# Patient Record
Sex: FEMALE | Race: WHITE | Employment: UNEMPLOYED | URBAN - METROPOLITAN AREA
[De-identification: names, ages, dates, MRNs, and addresses within clinical notes are randomized per-mention and may not be internally consistent; named-entity substitution may affect disease eponyms.]

---

## 2023-06-01 ENCOUNTER — HOSPITAL ENCOUNTER (EMERGENCY)
Age: 28
Discharge: HOME | End: 2023-06-02
Attending: EMERGENCY MEDICINE
Payer: MEDICAID

## 2023-06-01 ENCOUNTER — APPOINTMENT (OUTPATIENT)
Dept: GENERAL RADIOLOGY | Age: 28
End: 2023-06-01
Payer: MEDICAID

## 2023-06-01 DIAGNOSIS — J06.9 ACUTE UPPER RESPIRATORY INFECTION: Primary | ICD-10-CM

## 2023-06-01 DIAGNOSIS — J45.901 EXACERBATION OF ASTHMA, UNSPECIFIED ASTHMA SEVERITY, UNSPECIFIED WHETHER PERSISTENT: ICD-10-CM

## 2023-06-01 PROCEDURE — 87635 SARS-COV-2 COVID-19 AMP PRB: CPT

## 2023-06-01 PROCEDURE — 96372 THER/PROPH/DIAG INJ SC/IM: CPT

## 2023-06-01 PROCEDURE — 99284 EMERGENCY DEPT VISIT MOD MDM: CPT

## 2023-06-01 PROCEDURE — 87804 INFLUENZA ASSAY W/OPTIC: CPT

## 2023-06-01 PROCEDURE — 71045 X-RAY EXAM CHEST 1 VIEW: CPT

## 2023-06-01 PROCEDURE — 6360000002 HC RX W HCPCS: Performed by: EMERGENCY MEDICINE

## 2023-06-01 PROCEDURE — 87880 STREP A ASSAY W/OPTIC: CPT

## 2023-06-01 RX ORDER — METHYLPREDNISOLONE SODIUM SUCCINATE 125 MG/2ML
125 INJECTION, POWDER, LYOPHILIZED, FOR SOLUTION INTRAMUSCULAR; INTRAVENOUS ONCE
Status: COMPLETED | OUTPATIENT
Start: 2023-06-01 | End: 2023-06-01

## 2023-06-01 RX ORDER — IPRATROPIUM BROMIDE AND ALBUTEROL SULFATE 2.5; .5 MG/3ML; MG/3ML
1 SOLUTION RESPIRATORY (INHALATION)
Status: DISCONTINUED | OUTPATIENT
Start: 2023-06-01 | End: 2023-06-02 | Stop reason: HOSPADM

## 2023-06-01 RX ADMIN — METHYLPREDNISOLONE SODIUM SUCCINATE 125 MG: 125 INJECTION, POWDER, FOR SOLUTION INTRAMUSCULAR; INTRAVENOUS at 23:41

## 2023-06-01 ASSESSMENT — PAIN SCALES - GENERAL: PAINLEVEL_OUTOF10: 2

## 2023-06-01 ASSESSMENT — ENCOUNTER SYMPTOMS
COUGH: 1
COLOR CHANGE: 0
ABDOMINAL PAIN: 0
NAUSEA: 0
PHOTOPHOBIA: 0
TROUBLE SWALLOWING: 0
EYE PAIN: 0
VOICE CHANGE: 0
FACIAL SWELLING: 0
BACK PAIN: 0
CHEST TIGHTNESS: 0
SHORTNESS OF BREATH: 1
VOMITING: 0

## 2023-06-01 ASSESSMENT — PAIN - FUNCTIONAL ASSESSMENT: PAIN_FUNCTIONAL_ASSESSMENT: 0-10

## 2023-06-01 ASSESSMENT — PAIN DESCRIPTION - DESCRIPTORS: DESCRIPTORS: TINGLING

## 2023-06-01 ASSESSMENT — PAIN DESCRIPTION - LOCATION: LOCATION: CHEST

## 2023-06-01 ASSESSMENT — PAIN DESCRIPTION - FREQUENCY: FREQUENCY: CONTINUOUS

## 2023-06-02 VITALS
DIASTOLIC BLOOD PRESSURE: 81 MMHG | BODY MASS INDEX: 36.4 KG/M2 | TEMPERATURE: 97.2 F | HEART RATE: 94 BPM | HEIGHT: 71 IN | OXYGEN SATURATION: 91 % | WEIGHT: 260 LBS | SYSTOLIC BLOOD PRESSURE: 128 MMHG | RESPIRATION RATE: 18 BRPM

## 2023-06-02 LAB
FLUAV AG SPEC QL: NEGATIVE
FLUBV AG SPEC QL: NEGATIVE
S PYO AG THROAT QL: NEGATIVE
SARS-COV-2 RDRP RESP QL NAA+PROBE: NOT DETECTED
SPECIMEN DESCRIPTION: NORMAL
SPECIMEN SOURCE: NORMAL

## 2023-06-02 PROCEDURE — 94640 AIRWAY INHALATION TREATMENT: CPT

## 2023-06-02 PROCEDURE — 94761 N-INVAS EAR/PLS OXIMETRY MLT: CPT

## 2023-06-02 PROCEDURE — 6370000000 HC RX 637 (ALT 250 FOR IP): Performed by: EMERGENCY MEDICINE

## 2023-06-02 RX ORDER — PREDNISONE 20 MG/1
20 TABLET ORAL DAILY
Qty: 5 TABLET | Refills: 0 | Status: SHIPPED | OUTPATIENT
Start: 2023-06-02 | End: 2023-06-07

## 2023-06-02 RX ORDER — ALBUTEROL SULFATE 90 UG/1
2 AEROSOL, METERED RESPIRATORY (INHALATION) 4 TIMES DAILY PRN
Qty: 18 G | Refills: 0 | Status: SHIPPED | OUTPATIENT
Start: 2023-06-02

## 2023-06-02 RX ADMIN — IPRATROPIUM BROMIDE AND ALBUTEROL SULFATE 1 DOSE: .5; 3 SOLUTION RESPIRATORY (INHALATION) at 00:11

## 2023-06-02 NOTE — ED PROVIDER NOTES
EMERGENCY DEPARTMENT ENCOUNTER    Pt Name: Nathan Jauregui  MRN: [id number]  [place] 1995  Date of evaluation: 6/1/23  CHIEF COMPLAINT       Chief Complaint   Patient presents with    Nasal Congestion     Thought it was allergies but is now wheezing with deep breath, VS wnl in triage      HISTORY OF PRESENT ILLNESS   43-year-old female presenting to the ER complaining of a cough, shortness of breath for the last week. Patient does have an underlying history of asthma and has not been able to utilize an inhaler at home due to an insurance issue. The patient recently moved from [place]. Patient also is complaining of inflamed throat. The history is provided by the patient. Shortness of Breath  Severity:  Moderate  Onset quality:  Gradual  Duration:  1 week  Timing:  Constant  Chronicity:  New  Associated symptoms: cough    Associated symptoms: no abdominal pain, no chest pain, no headaches, no rash and no vomiting          REVIEW OF SYSTEMS     Review of Systems   Constitutional:  Negative for activity change, appetite change and fatigue. HENT:  Negative for facial swelling, trouble swallowing and voice change. Eyes:  Negative for photophobia and pain. Respiratory:  Positive for cough and shortness of breath. Negative for chest tightness. Cardiovascular:  Negative for chest pain and palpitations. Gastrointestinal:  Negative for abdominal pain, nausea and vomiting. Genitourinary:  Negative for dysuria and urgency. Musculoskeletal:  Negative for arthralgias and back pain. Skin:  Negative for color change and rash. Neurological:  Negative for dizziness, syncope and headaches. Psychiatric/Behavioral:  Negative for behavioral problems and hallucinations. PASTMEDICAL HISTORY   No past medical history on file. Past Problem List  There is no problem list on file for this patient. SURGICAL HISTORY     No past surgical history on file.   CURRENT MEDICATIONS       Discharge Medication List

## 2023-08-01 ENCOUNTER — OFFICE VISIT (OUTPATIENT)
Dept: OBGYN CLINIC | Age: 28
End: 2023-08-01
Payer: MEDICAID

## 2023-08-01 VITALS
HEIGHT: 71 IN | SYSTOLIC BLOOD PRESSURE: 124 MMHG | DIASTOLIC BLOOD PRESSURE: 78 MMHG | BODY MASS INDEX: 37.94 KG/M2 | HEART RATE: 93 BPM | WEIGHT: 271 LBS

## 2023-08-01 DIAGNOSIS — N92.1 BREAKTHROUGH BLEEDING ASSOCIATED WITH INTRAUTERINE DEVICE (IUD): ICD-10-CM

## 2023-08-01 DIAGNOSIS — Z97.5 BREAKTHROUGH BLEEDING ASSOCIATED WITH INTRAUTERINE DEVICE (IUD): ICD-10-CM

## 2023-08-01 DIAGNOSIS — Z11.51 SCREENING FOR HPV (HUMAN PAPILLOMAVIRUS): ICD-10-CM

## 2023-08-01 DIAGNOSIS — Z01.419 WELL WOMAN EXAM: Primary | ICD-10-CM

## 2023-08-01 PROCEDURE — 99385 PREV VISIT NEW AGE 18-39: CPT | Performed by: SPECIALIST

## 2023-08-01 RX ORDER — CEPHALEXIN 500 MG/1
CAPSULE ORAL
COMMUNITY
Start: 2023-07-20

## 2023-08-01 RX ORDER — ONDANSETRON 4 MG/1
TABLET, FILM COATED ORAL
COMMUNITY
Start: 2023-07-20

## 2023-08-01 SDOH — ECONOMIC STABILITY: FOOD INSECURITY: WITHIN THE PAST 12 MONTHS, THE FOOD YOU BOUGHT JUST DIDN'T LAST AND YOU DIDN'T HAVE MONEY TO GET MORE.: NEVER TRUE

## 2023-08-01 SDOH — ECONOMIC STABILITY: INCOME INSECURITY: HOW HARD IS IT FOR YOU TO PAY FOR THE VERY BASICS LIKE FOOD, HOUSING, MEDICAL CARE, AND HEATING?: NOT HARD AT ALL

## 2023-08-01 SDOH — ECONOMIC STABILITY: FOOD INSECURITY: WITHIN THE PAST 12 MONTHS, YOU WORRIED THAT YOUR FOOD WOULD RUN OUT BEFORE YOU GOT MONEY TO BUY MORE.: NEVER TRUE

## 2023-08-01 SDOH — ECONOMIC STABILITY: HOUSING INSECURITY
IN THE LAST 12 MONTHS, WAS THERE A TIME WHEN YOU DID NOT HAVE A STEADY PLACE TO SLEEP OR SLEPT IN A SHELTER (INCLUDING NOW)?: NO

## 2023-08-01 ASSESSMENT — PATIENT HEALTH QUESTIONNAIRE - PHQ9
SUM OF ALL RESPONSES TO PHQ QUESTIONS 1-9: 0
SUM OF ALL RESPONSES TO PHQ QUESTIONS 1-9: 0
SUM OF ALL RESPONSES TO PHQ9 QUESTIONS 1 & 2: 0
SUM OF ALL RESPONSES TO PHQ QUESTIONS 1-9: 0
1. LITTLE INTEREST OR PLEASURE IN DOING THINGS: 0
2. FEELING DOWN, DEPRESSED OR HOPELESS: 0
SUM OF ALL RESPONSES TO PHQ QUESTIONS 1-9: 0

## 2023-08-02 ASSESSMENT — ENCOUNTER SYMPTOMS
VOMITING: 0
ABDOMINAL PAIN: 0
NAUSEA: 0
ABDOMINAL DISTENTION: 0
EYE PAIN: 0
COUGH: 0
APNEA: 0
CONSTIPATION: 0
DIARRHEA: 0

## 2023-08-02 NOTE — PROGRESS NOTES
Status: She is alert and oriented to person, place, and time. Psychiatric:         Judgment: Judgment normal.       Assessment:      Patient with breakthrough bleeding due to IUD with otherwise normal annual exam.  She was told to return in 1 week for pap smear, which could not be performed due to bleeding. Plan:      Appointment in 1 week    IEmily am scribing for, and in the presence of Dr. Brittany Rivera. Electronically signed by: Emily Watson 8/2/23 1:31 PM       I agree to the above documentation placed by my scribe Emily Watson. I reviewed the scribe's note and agree with the documented findings and plan of care. Any areas of disagreement are noted on the chart. I have personally evaluated this patient. Additional findings are as noted. I agree with the chief complaint, past medical history, past surgical history, allergies, medications, social and family history as documented unless otherwise noted below.      Electronically signed by Brittany Rivera MD on 8/3/2023 at 10:54 AM

## 2023-08-03 PROBLEM — Z01.419 WELL WOMAN EXAM: Status: ACTIVE | Noted: 2023-08-03

## 2023-08-03 PROBLEM — Z11.51 SCREENING FOR HPV (HUMAN PAPILLOMAVIRUS): Status: ACTIVE | Noted: 2023-08-03

## 2023-08-03 PROBLEM — N92.1 BREAKTHROUGH BLEEDING ASSOCIATED WITH INTRAUTERINE DEVICE (IUD): Status: ACTIVE | Noted: 2023-08-03

## 2023-08-03 PROBLEM — Z97.5 BREAKTHROUGH BLEEDING ASSOCIATED WITH INTRAUTERINE DEVICE (IUD): Status: ACTIVE | Noted: 2023-08-03

## 2023-08-09 ENCOUNTER — OFFICE VISIT (OUTPATIENT)
Dept: FAMILY MEDICINE CLINIC | Age: 28
End: 2023-08-09
Payer: MEDICAID

## 2023-08-09 VITALS
HEART RATE: 88 BPM | TEMPERATURE: 97.4 F | OXYGEN SATURATION: 98 % | DIASTOLIC BLOOD PRESSURE: 65 MMHG | SYSTOLIC BLOOD PRESSURE: 105 MMHG

## 2023-08-09 DIAGNOSIS — S06.0X0D CONCUSSION WITHOUT LOSS OF CONSCIOUSNESS, SUBSEQUENT ENCOUNTER: Primary | ICD-10-CM

## 2023-08-09 PROCEDURE — 99203 OFFICE O/P NEW LOW 30 MIN: CPT | Performed by: FAMILY MEDICINE

## 2023-08-09 PROCEDURE — 1036F TOBACCO NON-USER: CPT | Performed by: FAMILY MEDICINE

## 2023-08-09 PROCEDURE — G8417 CALC BMI ABV UP PARAM F/U: HCPCS | Performed by: FAMILY MEDICINE

## 2023-08-09 PROCEDURE — G8427 DOCREV CUR MEDS BY ELIG CLIN: HCPCS | Performed by: FAMILY MEDICINE

## 2023-08-09 RX ORDER — ACETAMINOPHEN 500 MG
1000 TABLET ORAL 3 TIMES DAILY PRN
Qty: 60 TABLET | Refills: 1 | Status: SHIPPED | OUTPATIENT
Start: 2023-08-09

## 2023-08-09 ASSESSMENT — ENCOUNTER SYMPTOMS
VOMITING: 0
BLURRED VISION: 0

## 2023-08-14 ENCOUNTER — OFFICE VISIT (OUTPATIENT)
Dept: OBGYN CLINIC | Age: 28
End: 2023-08-14
Payer: MEDICAID

## 2023-08-14 ENCOUNTER — HOSPITAL ENCOUNTER (OUTPATIENT)
Age: 28
Setting detail: SPECIMEN
Discharge: HOME | End: 2023-08-14

## 2023-08-14 VITALS
HEIGHT: 71 IN | WEIGHT: 271 LBS | SYSTOLIC BLOOD PRESSURE: 110 MMHG | BODY MASS INDEX: 37.94 KG/M2 | DIASTOLIC BLOOD PRESSURE: 76 MMHG

## 2023-08-14 DIAGNOSIS — Z11.3 SCREEN FOR STD (SEXUALLY TRANSMITTED DISEASE): ICD-10-CM

## 2023-08-14 DIAGNOSIS — N76.0 ACUTE VAGINITIS: Primary | ICD-10-CM

## 2023-08-14 DIAGNOSIS — Z01.419 PAP SMEAR, LOW-RISK: ICD-10-CM

## 2023-08-14 PROCEDURE — G8417 CALC BMI ABV UP PARAM F/U: HCPCS | Performed by: SPECIALIST

## 2023-08-14 PROCEDURE — G8427 DOCREV CUR MEDS BY ELIG CLIN: HCPCS | Performed by: SPECIALIST

## 2023-08-14 PROCEDURE — 1036F TOBACCO NON-USER: CPT | Performed by: SPECIALIST

## 2023-08-14 PROCEDURE — 99213 OFFICE O/P EST LOW 20 MIN: CPT | Performed by: SPECIALIST

## 2023-08-14 RX ORDER — METRONIDAZOLE 500 MG/1
500 TABLET ORAL 2 TIMES DAILY
Qty: 14 TABLET | Refills: 0 | Status: SHIPPED | OUTPATIENT
Start: 2023-08-14 | End: 2023-08-21

## 2023-08-14 ASSESSMENT — ENCOUNTER SYMPTOMS
ABDOMINAL DISTENTION: 0
CONSTIPATION: 0
ABDOMINAL PAIN: 0
EYE PAIN: 0
COUGH: 0
APNEA: 0
VOMITING: 0
DIARRHEA: 0
NAUSEA: 0

## 2023-08-15 LAB
C TRACH DNA SPEC QL PROBE+SIG AMP: NEGATIVE
CANDIDA SPECIES: NEGATIVE
GARDNERELLA VAGINALIS: POSITIVE
N GONORRHOEA DNA SPEC QL PROBE+SIG AMP: NEGATIVE
SOURCE: ABNORMAL
SPECIMEN DESCRIPTION: NORMAL
TRICHOMONAS: NEGATIVE

## 2023-08-23 LAB — CYTOLOGY REPORT: NORMAL

## 2023-09-13 PROBLEM — Z11.3 SCREEN FOR STD (SEXUALLY TRANSMITTED DISEASE): Status: RESOLVED | Noted: 2023-08-03 | Resolved: 2023-09-13

## 2023-09-13 PROBLEM — Z01.419 PAP SMEAR, LOW-RISK: Status: RESOLVED | Noted: 2023-08-14 | Resolved: 2023-09-13

## 2023-10-14 ENCOUNTER — HOSPITAL ENCOUNTER (EMERGENCY)
Age: 28
Discharge: HOME | End: 2023-10-14
Attending: STUDENT IN AN ORGANIZED HEALTH CARE EDUCATION/TRAINING PROGRAM
Payer: MEDICAID

## 2023-10-14 VITALS
BODY MASS INDEX: 35 KG/M2 | TEMPERATURE: 99.9 F | HEART RATE: 77 BPM | OXYGEN SATURATION: 97 % | SYSTOLIC BLOOD PRESSURE: 126 MMHG | DIASTOLIC BLOOD PRESSURE: 73 MMHG | RESPIRATION RATE: 16 BRPM | HEIGHT: 71 IN | WEIGHT: 250 LBS

## 2023-10-14 DIAGNOSIS — S93.401A SPRAIN OF RIGHT ANKLE, UNSPECIFIED LIGAMENT, INITIAL ENCOUNTER: Primary | ICD-10-CM

## 2023-10-14 PROCEDURE — 99283 EMERGENCY DEPT VISIT LOW MDM: CPT

## 2023-10-14 ASSESSMENT — PAIN SCALES - GENERAL: PAINLEVEL_OUTOF10: 5

## 2023-10-14 ASSESSMENT — PAIN - FUNCTIONAL ASSESSMENT: PAIN_FUNCTIONAL_ASSESSMENT: 0-10

## 2023-10-15 NOTE — ED NOTES
Pt report she has a bump on right side of her vaginal area. Pt reports it has been there for a few days.       Jane Kaur  10/14/23 2113

## 2023-10-15 NOTE — ED PROVIDER NOTES
[facility]      Pt Name: Obed Madden  MRN: [id number]  [address] [place] 1995  Date of evaluation: 10/15/23    CHIEF COMPLAINT       Chief Complaint   Patient presents with    Ankle Pain     Right ankle pain from sprain in September, has not been able to make ortho appointment due to lack of transportation        HISTORY OF PRESENT ILLNESS   Obed Madden is a 33 y.o. female who presents with pain to the right lateral ankle. Pain started after injury. Denies any loss of consciousness. Injury occurred just over a month ago. Initial x-rays showed some concern for possible talar fracture. Was sent for orthopedic follow-up but states was unable to. Pain has been persistent and continuing for the past over a month. Worse with ambulation.     PASTMEDICAL HISTORY     Past Medical History:   Diagnosis Date    Anemia April 26th 2016    I recently got told i didnt have anemia but im not sure    Asthma     Migraine      Past Problem List  Patient Active Problem List   Diagnosis Code    Breakthrough bleeding associated with intrauterine device (IUD) N92.1, Z97.5    Acute vaginitis N76.0       SURGICAL HISTORY       Past Surgical History:   Procedure Laterality Date    WISDOM TOOTH EXTRACTION         CURRENT MEDICATIONS       Discharge Medication List as of 10/14/2023  9:28 PM        CONTINUE these medications which have NOT CHANGED    Details   acetaminophen (TYLENOL) 500 MG tablet Take 2 tablets by mouth 3 times daily as needed for Pain, Disp-60 tablet, R-1Normal      cephALEXin (KEFLEX) 500 MG capsule take 1 capsule by mouth IN THE MORNING AT 12PM and at bedtime for 7 daysHistorical Med      ondansetron (ZOFRAN) 4 MG tablet take 1 tablet by mouth every 8 hours if needed nausea and vomiting for up to 3 daysHistorical Med      albuterol sulfate HFA (VENTOLIN HFA) 108 (90 Base) MCG/ACT inhaler Inhale 2 puffs into the lungs 4 times daily as needed for Wheezing, Disp-18 g, R-0Print             ALLERGIES

## 2023-10-15 NOTE — DISCHARGE INSTRUCTIONS
Please follow-up with the podiatrist next week. Use an ice pack or bag filled with ice and apply to the injured area 3 - 4 times a day for 15 - 20 minutes each time. Use ibuprofen or Tylenol (unless prescribed medications that have Tylenol in it) for pain. You can take over the counter Ibuprofen (advil) tablets (4 every 8 hours or 3 every 6 hours or 2 every 4 hours)    Return to the Emergency Department for worsening of pain, increase swelling to the ankle, inability to move your toes, any other care or concern.

## 2023-10-15 NOTE — ED TRIAGE NOTES
Pt comes to the ED w/ c/o right ankle pain. Pt reports that she sprained her right ankle and was seen at [facility] and referred pt to ortho. Pt has never sought follow up and is no c/o increasing ankle pain that started Thursday. Denies additional injury or trauma. Pt resting in bed w/ no s/s of distress.  Patient denies any needs at this time and has call light within reach, will continue to monitor

## 2023-11-06 ENCOUNTER — HOSPITAL ENCOUNTER (OUTPATIENT)
Dept: MRI IMAGING | Age: 28
Discharge: HOME | End: 2023-11-08
Payer: MEDICAID

## 2023-11-06 DIAGNOSIS — S93.401A SPRAIN OF RIGHT ANKLE, UNSPECIFIED LIGAMENT, INITIAL ENCOUNTER: ICD-10-CM

## 2023-11-06 PROCEDURE — 73721 MRI JNT OF LWR EXTRE W/O DYE: CPT

## 2023-11-28 ENCOUNTER — NURSE TRIAGE (OUTPATIENT)
Dept: OTHER | Facility: CLINIC | Age: 28
End: 2023-11-28

## 2023-11-28 NOTE — TELEPHONE ENCOUNTER
Location of patient: [place]    Received call from April at [facility]; Patient with Red Flag Complaint requesting to establish care with [address] Subjective: Caller states \" 2 weeks ago at ED, worsening pain and tingling in right ankle. \"     Current Symptoms:   \"Sprung ankle is September, went to ED 6 times, 3 xrays, they gave me a walking boot and scheudled me for MRI, this showed torn tendons and fluid around my ankle. \"    Pain worsening, swollen, and redness. Tingling starts in ankle and goes up and down leg sometimes, have had this for about a month as well, comes and goes. Onset: Over a month ago; worsening    Associated Symptoms: reduced activity    Pain Severity: 6/10; ; constant    Temperature: Denies     What has been tried: Tylenol and Ibuprofen not working. LMP: NA Pregnant: Unknown    Recommended disposition: Go to [facility]    Care advice provided, patient verbalizes understanding; denies any other questions or concerns; instructed to call back for any new or worsening symptoms. Patient/Caller agrees with recommended disposition; writer provided warm transfer to [employer] at [facility] for appointment scheduling    Attention Provider: Thank you for allowing me to participate in the care of your patient. The patient was connected to triage in response to information provided to the [facility]. Please do not respond through this encounter as the response is not directed to a shared pool. Reason for Disposition   SEVERE pain (e.g., excruciating, unable to walk) and not improved after 2 hours of pain medicine    Protocols used:  Ankle Pain-ADULT-[place]

## 2024-05-16 ENCOUNTER — HOSPITAL ENCOUNTER (EMERGENCY)
Age: 29
Discharge: HOME | End: 2024-05-16
Attending: EMERGENCY MEDICINE
Payer: MEDICAID

## 2024-05-16 ENCOUNTER — APPOINTMENT (OUTPATIENT)
Dept: GENERAL RADIOLOGY | Age: 29
End: 2024-05-16
Payer: MEDICAID

## 2024-05-16 VITALS
HEART RATE: 89 BPM | SYSTOLIC BLOOD PRESSURE: 138 MMHG | RESPIRATION RATE: 20 BRPM | TEMPERATURE: 99.5 F | OXYGEN SATURATION: 97 % | HEIGHT: 71 IN | DIASTOLIC BLOOD PRESSURE: 66 MMHG | WEIGHT: 200 LBS | BODY MASS INDEX: 28 KG/M2

## 2024-05-16 DIAGNOSIS — J06.9 ACUTE UPPER RESPIRATORY INFECTION: ICD-10-CM

## 2024-05-16 DIAGNOSIS — R50.9 FEVER, UNSPECIFIED FEVER CAUSE: Primary | ICD-10-CM

## 2024-05-16 DIAGNOSIS — R51.9 ACUTE NONINTRACTABLE HEADACHE, UNSPECIFIED HEADACHE TYPE: ICD-10-CM

## 2024-05-16 LAB
FLUAV RNA RESP QL NAA+PROBE: NOT DETECTED
FLUBV RNA RESP QL NAA+PROBE: NOT DETECTED
SARS-COV-2 RNA RESP QL NAA+PROBE: NOT DETECTED
SOURCE: NORMAL
SPECIMEN DESCRIPTION: NORMAL

## 2024-05-16 PROCEDURE — 87636 SARSCOV2 & INF A&B AMP PRB: CPT

## 2024-05-16 PROCEDURE — 99284 EMERGENCY DEPT VISIT MOD MDM: CPT

## 2024-05-16 PROCEDURE — 6370000000 HC RX 637 (ALT 250 FOR IP): Performed by: PHYSICIAN ASSISTANT

## 2024-05-16 PROCEDURE — 71046 X-RAY EXAM CHEST 2 VIEWS: CPT

## 2024-05-16 PROCEDURE — 6370000000 HC RX 637 (ALT 250 FOR IP): Performed by: EMERGENCY MEDICINE

## 2024-05-16 RX ORDER — BENZONATATE 100 MG/1
100 CAPSULE ORAL 2 TIMES DAILY PRN
Qty: 14 CAPSULE | Refills: 0 | Status: SHIPPED | OUTPATIENT
Start: 2024-05-16 | End: 2024-05-23

## 2024-05-16 RX ORDER — IBUPROFEN 800 MG/1
800 TABLET ORAL ONCE
Status: COMPLETED | OUTPATIENT
Start: 2024-05-16 | End: 2024-05-16

## 2024-05-16 RX ORDER — ACETAMINOPHEN 500 MG
1000 TABLET ORAL EVERY 6 HOURS PRN
Qty: 360 TABLET | Refills: 1 | Status: SHIPPED | OUTPATIENT
Start: 2024-05-16

## 2024-05-16 RX ORDER — ONDANSETRON 4 MG/1
4 TABLET, ORALLY DISINTEGRATING ORAL ONCE
Status: COMPLETED | OUTPATIENT
Start: 2024-05-16 | End: 2024-05-16

## 2024-05-16 RX ORDER — IBUPROFEN 600 MG/1
600 TABLET ORAL EVERY 6 HOURS PRN
Qty: 120 TABLET | Refills: 0 | Status: SHIPPED | OUTPATIENT
Start: 2024-05-16

## 2024-05-16 RX ORDER — BUTALBITAL, ACETAMINOPHEN AND CAFFEINE 50; 325; 40 MG/1; MG/1; MG/1
1 TABLET ORAL ONCE
Status: COMPLETED | OUTPATIENT
Start: 2024-05-16 | End: 2024-05-16

## 2024-05-16 RX ORDER — ACETAMINOPHEN 500 MG
1000 TABLET ORAL ONCE
Status: COMPLETED | OUTPATIENT
Start: 2024-05-16 | End: 2024-05-16

## 2024-05-16 RX ADMIN — ONDANSETRON 4 MG: 4 TABLET, ORALLY DISINTEGRATING ORAL at 21:30

## 2024-05-16 RX ADMIN — ACETAMINOPHEN 1000 MG: 500 TABLET ORAL at 20:47

## 2024-05-16 RX ADMIN — BUTALBITAL, ACETAMINOPHEN, AND CAFFEINE 1 TABLET: 50; 325; 40 TABLET ORAL at 22:32

## 2024-05-16 RX ADMIN — IBUPROFEN 800 MG: 800 TABLET ORAL at 21:30

## 2024-05-16 ASSESSMENT — PAIN SCALES - GENERAL: PAINLEVEL_OUTOF10: 3

## 2024-05-16 ASSESSMENT — PAIN - FUNCTIONAL ASSESSMENT: PAIN_FUNCTIONAL_ASSESSMENT: NONE - DENIES PAIN

## 2024-05-17 ASSESSMENT — ENCOUNTER SYMPTOMS: COUGH: 1

## 2024-05-17 NOTE — ED PROVIDER NOTES
EMERGENCY DEPARTMENT ENCOUNTER    Pt Name: Cyndi Varner  MRN: [id number]  Birthdate 1995  Date of evaluation: 5/16/24  CHIEF COMPLAINT       Chief Complaint   Patient presents with    Fever    Headache     Patient states she has had a cough and sneeze for a couple of days. Patient states she has had a fever today. Patient states temp was 101.5. patient did not take tylenol or motrin.      HISTORY OF PRESENT ILLNESS   29-year-old male presents emergency room for headache fever generalized malaise cough and congestion.  Symptoms started 2 days ago.  Fever and headache were present today which did not bother her as much the last couple of days.  Patient does not have any major medical problems that she is aware of.  When she had the fever tonight she was feeling lightheaded and dizzy and decided to come to the emergency room.             REVIEW OF SYSTEMS     Review of Systems   Constitutional:  Positive for activity change, fatigue and fever.   HENT:  Positive for congestion.    Respiratory:  Positive for cough.    Neurological:  Positive for light-headedness.     PASTMEDICAL HISTORY     Past Medical History:   Diagnosis Date    Anemia April 26th 2016    I recently got told i didnt have anemia but im not sure    Asthma     Migraine      Past Problem List  Patient Active Problem List   Diagnosis Code    Breakthrough bleeding associated with intrauterine device (IUD) N92.1, Z97.5    Acute vaginitis N76.0     SURGICAL HISTORY       Past Surgical History:   Procedure Laterality Date    WISDOM TOOTH EXTRACTION       CURRENT MEDICATIONS       Discharge Medication List as of 5/16/2024 10:49 PM        CONTINUE these medications which have NOT CHANGED    Details   cephALEXin (KEFLEX) 500 MG capsule take 1 capsule by mouth IN THE MORNING AT 12PM and at bedtime for 7 daysHistorical Med      ondansetron (ZOFRAN) 4 MG tablet take 1 tablet by mouth every 8 hours if needed nausea and vomiting for up to 3 daysHistorical Med

## 2024-05-17 NOTE — DISCHARGE INSTRUCTIONS
You can take Tylenol and or Motrin to help with headache fever body aches.  Tessalon can be used to help with cough.  If symptoms worsen you may return to the emergency room at any time.  It is common for upper respiratory infections to last several days to 10 days.

## 2024-05-23 ENCOUNTER — HOSPITAL ENCOUNTER (OUTPATIENT)
Dept: PULMONOLOGY | Age: 29
Discharge: HOME | End: 2024-05-23

## 2024-12-19 ENCOUNTER — HOSPITAL ENCOUNTER (EMERGENCY)
Age: 29
Discharge: HOME | End: 2024-12-19
Attending: STUDENT IN AN ORGANIZED HEALTH CARE EDUCATION/TRAINING PROGRAM
Payer: MEDICAID

## 2024-12-19 ENCOUNTER — APPOINTMENT (OUTPATIENT)
Dept: GENERAL RADIOLOGY | Age: 29
End: 2024-12-19
Payer: MEDICAID

## 2024-12-19 VITALS
SYSTOLIC BLOOD PRESSURE: 115 MMHG | BODY MASS INDEX: 32.08 KG/M2 | DIASTOLIC BLOOD PRESSURE: 81 MMHG | OXYGEN SATURATION: 98 % | HEART RATE: 75 BPM | WEIGHT: 230 LBS | TEMPERATURE: 98.1 F | RESPIRATION RATE: 14 BRPM

## 2024-12-19 DIAGNOSIS — M25.571 CHRONIC PAIN OF RIGHT ANKLE: Primary | ICD-10-CM

## 2024-12-19 DIAGNOSIS — L73.9 FOLLICULITIS: ICD-10-CM

## 2024-12-19 DIAGNOSIS — G89.29 CHRONIC PAIN OF RIGHT ANKLE: Primary | ICD-10-CM

## 2024-12-19 PROCEDURE — 99283 EMERGENCY DEPT VISIT LOW MDM: CPT

## 2024-12-19 PROCEDURE — 73610 X-RAY EXAM OF ANKLE: CPT

## 2024-12-19 PROCEDURE — 6370000000 HC RX 637 (ALT 250 FOR IP): Performed by: STUDENT IN AN ORGANIZED HEALTH CARE EDUCATION/TRAINING PROGRAM

## 2024-12-19 RX ORDER — IBUPROFEN 800 MG/1
800 TABLET, FILM COATED ORAL ONCE
Status: COMPLETED | OUTPATIENT
Start: 2024-12-19 | End: 2024-12-19

## 2024-12-19 RX ORDER — CYCLOBENZAPRINE HCL 10 MG
10 TABLET ORAL 2 TIMES DAILY PRN
Qty: 20 TABLET | Refills: 0 | Status: SHIPPED | OUTPATIENT
Start: 2024-12-19 | End: 2024-12-29

## 2024-12-19 RX ORDER — IBUPROFEN 600 MG/1
600 TABLET, FILM COATED ORAL EVERY 6 HOURS PRN
Qty: 30 TABLET | Refills: 0 | Status: SHIPPED | OUTPATIENT
Start: 2024-12-19

## 2024-12-19 RX ORDER — CEPHALEXIN 500 MG/1
500 CAPSULE ORAL ONCE
Status: COMPLETED | OUTPATIENT
Start: 2024-12-19 | End: 2024-12-19

## 2024-12-19 RX ORDER — CEPHALEXIN 500 MG/1
500 CAPSULE ORAL 2 TIMES DAILY
Qty: 14 CAPSULE | Refills: 0 | Status: SHIPPED | OUTPATIENT
Start: 2024-12-19 | End: 2024-12-26

## 2024-12-19 RX ORDER — CYCLOBENZAPRINE HCL 10 MG
10 TABLET ORAL ONCE
Status: COMPLETED | OUTPATIENT
Start: 2024-12-19 | End: 2024-12-19

## 2024-12-19 RX ADMIN — IBUPROFEN 800 MG: 800 TABLET ORAL at 02:04

## 2024-12-19 RX ADMIN — CEPHALEXIN 500 MG: 500 CAPSULE ORAL at 02:04

## 2024-12-19 RX ADMIN — CYCLOBENZAPRINE 10 MG: 10 TABLET, FILM COATED ORAL at 02:04

## 2024-12-19 ASSESSMENT — PAIN SCALES - GENERAL
PAINLEVEL_OUTOF10: 2
PAINLEVEL_OUTOF10: 6
PAINLEVEL_OUTOF10: 4

## 2024-12-19 ASSESSMENT — PAIN DESCRIPTION - LOCATION: LOCATION: ANKLE

## 2024-12-19 ASSESSMENT — PAIN - FUNCTIONAL ASSESSMENT: PAIN_FUNCTIONAL_ASSESSMENT: 0-10

## 2024-12-19 NOTE — ED NOTES
Pt arrived to ED with c/o ankle pain, back, and flank pain. Pt states she had ankle surgery in march and the pain doesn't feel how they told her it would. Pt states she has back pain and it makes her not want to get out of bed in the morning. Pt states she has bilateral flank pain x1 day. Pt states she also had a bump on the back of her neck/ hair line and doesn't know if it is a pimple, or infected hair because it hurts. Pt is ambulatory to room. Pt is A&Ox4.

## 2024-12-20 NOTE — ED PROVIDER NOTES
[facility] EMERGENCY DEPARTMENT ENCOUNTER      Pt Name: Cyndi Varner  MRN: [id number]  Birthdate 1995  Date of evaluation: 12/19/24    CHIEF COMPLAINT       Chief Complaint   Patient presents with    Ankle Pain     RT    Back Pain     Lower    Flank Pain     Both side on and off       HISTORY OF PRESENT ILLNESS   Cyndi Varner is a 29 y.o. female who presents with pain to the right ankle, low back, neck pimple.  Denies any known injury.  Does report previous history of right ankle surgery.  Over 6 months ago.     PASTMEDICAL HISTORY     Past Medical History:   Diagnosis Date    Anemia April 26th 2016    I recently got told i didnt have anemia but im not sure    Asthma     Migraine      Past Problem List  Patient Active Problem List   Diagnosis Code    Breakthrough bleeding associated with intrauterine device (IUD) N92.1, Z97.5    Acute vaginitis N76.0       SURGICAL HISTORY       Past Surgical History:   Procedure Laterality Date    WISDOM TOOTH EXTRACTION         CURRENT MEDICATIONS       Discharge Medication List as of 12/19/2024  3:14 AM        CONTINUE these medications which have NOT CHANGED    Details   acetaminophen (TYLENOL) 500 MG tablet Take 2 tablets by mouth every 6 hours as needed for Pain, Disp-360 tablet, R-1Normal      ondansetron (ZOFRAN) 4 MG tablet take 1 tablet by mouth every 8 hours if needed nausea and vomiting for up to 3 daysHistorical Med      albuterol sulfate HFA (VENTOLIN HFA) 108 (90 Base) MCG/ACT inhaler Inhale 2 puffs into the lungs 4 times daily as needed for Wheezing, Disp-18 g, R-0Print             ALLERGIES     has No Known Allergies.    FAMILY HISTORY     She indicated that the status of her father is unknown.       SOCIAL HISTORY       Social History     Tobacco Use    Smoking status: Never    Smokeless tobacco: Never   Vaping Use    Vaping status: Every Day   Substance Use Topics    Alcohol use: Yes     Comment: occasionally    Drug use: Yes     Types: Marijuana (Weed)

## 2025-04-11 ENCOUNTER — HOSPITAL ENCOUNTER (EMERGENCY)
Age: 30
Discharge: HOME | End: 2025-04-11
Attending: EMERGENCY MEDICINE
Payer: MEDICAID

## 2025-04-11 VITALS
HEART RATE: 94 BPM | OXYGEN SATURATION: 97 % | SYSTOLIC BLOOD PRESSURE: 132 MMHG | WEIGHT: 250 LBS | BODY MASS INDEX: 35 KG/M2 | DIASTOLIC BLOOD PRESSURE: 84 MMHG | TEMPERATURE: 98 F | RESPIRATION RATE: 20 BRPM | HEIGHT: 71 IN

## 2025-04-11 DIAGNOSIS — R05.1 ACUTE COUGH: Primary | ICD-10-CM

## 2025-04-11 DIAGNOSIS — M25.571 CHRONIC PAIN OF RIGHT ANKLE: ICD-10-CM

## 2025-04-11 DIAGNOSIS — G89.29 CHRONIC PAIN OF RIGHT ANKLE: ICD-10-CM

## 2025-04-11 PROCEDURE — 99283 EMERGENCY DEPT VISIT LOW MDM: CPT

## 2025-04-11 RX ORDER — ALBUTEROL SULFATE 90 UG/1
2 INHALANT RESPIRATORY (INHALATION) 4 TIMES DAILY PRN
Qty: 18 G | Refills: 0 | Status: SHIPPED | OUTPATIENT
Start: 2025-04-11

## 2025-04-11 RX ORDER — BENZONATATE 100 MG/1
100 CAPSULE ORAL 3 TIMES DAILY PRN
Qty: 30 CAPSULE | Refills: 0 | Status: SHIPPED | OUTPATIENT
Start: 2025-04-11 | End: 2025-04-21

## 2025-04-11 ASSESSMENT — ENCOUNTER SYMPTOMS: COUGH: 1

## 2025-04-11 ASSESSMENT — PAIN - FUNCTIONAL ASSESSMENT: PAIN_FUNCTIONAL_ASSESSMENT: NONE - DENIES PAIN

## 2025-04-11 NOTE — ED PROVIDER NOTES
EMERGENCY DEPARTMENT ENCOUNTER    Pt Name: Cyndi Varner  MRN: [id number]  Birthdate 1995  Date of evaluation: 4/11/25  CHIEF COMPLAINT       Chief Complaint   Patient presents with    Cough     Ongoing for one week. Negative covid test.     Ankle Pain     Ongoing for one year, had surgery in March 2024. Has been having chronic pain. Intermittent swelling.      HISTORY OF PRESENT ILLNESS   30-year-old female presenting to the emergency department today with a chief complaint of cough which has been going on for a week.  She states that she has been using her partners inhaler and Tessalon Perles and would like to get prescriptions for those.  She also states that she has chronic ankle pain from her prior injuries and that she has had a flareup of her pain.  She denies any other systemic signs or symptoms otherwise.             REVIEW OF SYSTEMS     Review of Systems   Respiratory:  Positive for cough.    Musculoskeletal:         Right ankle pain   All other systems reviewed and are negative.    PASTMEDICAL HISTORY     Past Medical History:   Diagnosis Date    Anemia April 26th 2016    I recently got told i didnt have anemia but im not sure    Asthma     Migraine      Past Problem List  Patient Active Problem List   Diagnosis Code    Breakthrough bleeding associated with intrauterine device (IUD) N92.1, Z97.5    Acute vaginitis N76.0     SURGICAL HISTORY       Past Surgical History:   Procedure Laterality Date    WISDOM TOOTH EXTRACTION       CURRENT MEDICATIONS       Previous Medications    ACETAMINOPHEN (TYLENOL) 500 MG TABLET    Take 2 tablets by mouth every 6 hours as needed for Pain    ALBUTEROL SULFATE HFA (VENTOLIN HFA) 108 (90 BASE) MCG/ACT INHALER    Inhale 2 puffs into the lungs 4 times daily as needed for Wheezing    IBUPROFEN (ADVIL;MOTRIN) 600 MG TABLET    Take 1 tablet by mouth every 6 hours as needed for Pain    ONDANSETRON (ZOFRAN) 4 MG TABLET    take 1 tablet by mouth every 8 hours if needed nausea  Oral   SpO2: 97%   Weight: 113.4 kg (250 lb)   Height: 1.803 m (5' 11\")     MEDICATIONS GIVEN TO PATIENT THIS ENCOUNTER:  No orders of the defined types were placed in this encounter.    DISCHARGE PRESCRIPTIONS:  Discharge Medication List as of 4/11/2025  6:45 AM        START taking these medications    Details   benzonatate (TESSALON) 100 MG capsule Take 1 capsule by mouth 3 times daily as needed for Cough, Disp-30 capsule, R-0Normal           PHYSICIAN CONSULTS ORDERED THIS ENCOUNTER:  None  FINAL IMPRESSION      1. Acute cough    2. Chronic pain of right ankle          DISPOSITION/PLAN   DISPOSITION      Discharged in a hemodynamically stable condition          OUTPATIENT FOLLOW UP THE PATIENT:  [facility] Emergency Department  [facility] [address]  [phone]  Go to   If symptoms worsen      Toni Crowley MD        This note was created with the assistance of a speech-recognition program. While intending to generate a document that actually reflects the content of the visit, no guarantees can be provided that every mistake has been identified and corrected by editing.       Toni Crowley MD  04/11/25 [id number]

## 2025-04-11 NOTE — DISCHARGE INSTRUCTIONS
Use inhaler as prescribed.  You can take Tessalon Perles as needed for cough.  Follow-up with your primary care physician and orthopedic surgeon and return back to the emergency department immediately if you notice any concerning or worsening signs or symptoms.